# Patient Record
Sex: MALE | URBAN - METROPOLITAN AREA
[De-identification: names, ages, dates, MRNs, and addresses within clinical notes are randomized per-mention and may not be internally consistent; named-entity substitution may affect disease eponyms.]

---

## 2018-01-08 ENCOUNTER — IMPORTED ENCOUNTER (OUTPATIENT)
Dept: URBAN - METROPOLITAN AREA CLINIC 9 | Facility: CLINIC | Age: 63
End: 2018-01-08

## 2018-01-10 ENCOUNTER — IMPORTED ENCOUNTER (OUTPATIENT)
Dept: URBAN - METROPOLITAN AREA CLINIC 9 | Facility: CLINIC | Age: 63
End: 2018-01-10

## 2018-01-17 ENCOUNTER — IMPORTED ENCOUNTER (OUTPATIENT)
Dept: URBAN - METROPOLITAN AREA CLINIC 9 | Facility: CLINIC | Age: 63
End: 2018-01-17

## 2018-02-08 ENCOUNTER — IMPORTED ENCOUNTER (OUTPATIENT)
Dept: URBAN - METROPOLITAN AREA CLINIC 9 | Facility: CLINIC | Age: 63
End: 2018-02-08

## 2018-02-19 ENCOUNTER — IMPORTED ENCOUNTER (OUTPATIENT)
Dept: URBAN - METROPOLITAN AREA CLINIC 9 | Facility: CLINIC | Age: 63
End: 2018-02-19

## 2018-02-20 NOTE — PATIENT DISCUSSION
CATARACTS, OU: VISUALLY SIGNIFICANT. OPTION OF SURGERY VERSUS FOLLOWING VERSUS UPDATING GLASSES DISCUSSED. RBA'S DISCUSSED, PATIENT UNDERSTANDS AND DESIRES SURGERY TO INCREASE VISION FOR WATCHING T.V. AND TO REDUCE GLARE. SCHEDULE CATARACT SURGERY/PRE-OP OU.

## 2018-03-01 NOTE — PATIENT DISCUSSION
CATARACT SX OS 03/20/2018: RBA'S DISCUSSED, PATIENT UNDERSTANDS AND DESIRES TO PROCEED WITH SURGERY. CONSENT READ AND SIGNED. PATIENT DESIRES STANDARD SET FOR DISTANCE VISION.

## 2018-03-21 NOTE — PATIENT DISCUSSION
S/P PE IOL, OS. STABLE. CONTINUE DROPS AS DIRECTED. SAW WITH DR. GARCIA TODAY- WILL WATCH LENS MATERIAL. NO INDICATIONS FOR INTERVENTION AT THIS TIME. RETURN FOR FOLLOW-UP IN 2 WEEKS.

## 2018-04-02 ENCOUNTER — IMPORTED ENCOUNTER (OUTPATIENT)
Dept: URBAN - METROPOLITAN AREA CLINIC 9 | Facility: CLINIC | Age: 63
End: 2018-04-02

## 2018-04-02 NOTE — PATIENT DISCUSSION
CATARACT, OD: VISUALLY SIGNIFICANT. OPTION OF SURGERY VERSUS FOLLOWING VERSUS UPDATING GLASSES DISCUSSED. RBA'S DISCUSSED TODAY WITH DR. GARCIA, PATIENT UNDERSTANDS AND DESIRES SURGERY TO INCREASE VISION FOR READING.  RTC FOR CAT SX OD

## 2021-02-10 NOTE — PATIENT DISCUSSION
PTERYGIUM, OU: STABLE. PRESCRIBE ARTIFICIAL TEARS AS NEEDED AND INCREASE UV PROTECTION. CONSIDER CONSULT WITH [facility] SPECIALIST IF CROSSES INTO LINE OF VISION.

## 2021-02-10 NOTE — PATIENT DISCUSSION
EPIPHORA OU:SECONDARY TO PUNCTAL STENOSIS:  PRESCRIBE ARTIFICIAL TEARS SEVERAL TIMES A DAY. OPTION OF CONSULT WITH DR Carmencita Adam DISCUSSED FOR FURTHER EVAL.   PATIENT DESIRES CONSULT

## 2021-02-10 NOTE — PATIENT DISCUSSION
POSTERIOR CAPSULAR FIBROSIS, OU:  VISUALLY SIGNIFICANT. OPTION OF YAG LASER VERSUS UPDATING GLASSES VERSUS FOLLOWING DISCUSSED. RBA'S DISCUSSED, PATIENT UNDERSTANDS AND DESIRES YAG LASER TO INCREASE VISION FOR READING, GLARE FROM LIGHTS AND SEEING OBJECTS IN THE [place].   SCHEDULE YAG LASER OS THEN OD.

## 2021-10-16 ASSESSMENT — VISUAL ACUITY
OD_CC: 20/25 + SN
OD_CC: 20/30 SN
OS_CC: 20/40 + SN
OS_CC: 20/40 -2 SN
OS_PH: 20/40 + SN
OS_PH: 20/60 - SN
OS_CC: 20/30 - SN
OD_SC: 20/30 + SN
OD_SC: 20/60 - SN
OS_SC: 20/40 SN
OS_SC: 20/40 + SN
OD_CC: 20/25 SN
OD_SC: 20/70 SN
OD_PH: 20/40 + SN
OS_CC: 20/40 - SN
OD_CC: 20/30 - SN
OD_PH: 20/30 - SN
OS_SC: 20/100 SN
OS_PH: 20/40 +2 SN

## 2021-10-16 ASSESSMENT — TONOMETRY
OD_IOP_MMHG: 15
OS_IOP_MMHG: 13
OD_IOP_MMHG: 14
OS_IOP_MMHG: 14
OD_IOP_MMHG: 16
OS_IOP_MMHG: 15
OS_IOP_MMHG: 13
OS_IOP_MMHG: 14
OD_IOP_MMHG: 15
OD_IOP_MMHG: 16
OD_IOP_MMHG: 16
OS_IOP_MMHG: 16